# Patient Record
Sex: MALE | Race: WHITE | URBAN - METROPOLITAN AREA
[De-identification: names, ages, dates, MRNs, and addresses within clinical notes are randomized per-mention and may not be internally consistent; named-entity substitution may affect disease eponyms.]

---

## 2020-07-31 ENCOUNTER — APPOINTMENT (RX ONLY)
Dept: URBAN - METROPOLITAN AREA CLINIC 146 | Facility: CLINIC | Age: 60
Setting detail: DERMATOLOGY
End: 2020-07-31

## 2020-07-31 VITALS — TEMPERATURE: 96.98 F

## 2020-07-31 DIAGNOSIS — D17 BENIGN LIPOMATOUS NEOPLASM: ICD-10-CM

## 2020-07-31 PROBLEM — D17.22 BENIGN LIPOMATOUS NEOPLASM OF SKIN AND SUBCUTANEOUS TISSUE OF LEFT ARM: Status: ACTIVE | Noted: 2020-07-31

## 2020-07-31 PROCEDURE — ? COUNSELING

## 2020-07-31 PROCEDURE — ? FULL BODY SKIN EXAM - DECLINED

## 2020-07-31 PROCEDURE — 99202 OFFICE O/P NEW SF 15 MIN: CPT

## 2020-07-31 PROCEDURE — ? ADDITIONAL NOTES

## 2020-07-31 ASSESSMENT — LOCATION ZONE DERM: LOCATION ZONE: ARM

## 2020-07-31 ASSESSMENT — LOCATION SIMPLE DESCRIPTION DERM: LOCATION SIMPLE: LEFT SHOULDER

## 2020-07-31 ASSESSMENT — LOCATION DETAILED DESCRIPTION DERM: LOCATION DETAILED: LEFT ANTERIOR SHOULDER

## 2023-07-14 NOTE — HPI: SKIN LESION
[facility]  [address] [address]  [phone]    Jack Pall  : 1949     HPI   69 y.o., male returns in follow up for CaP. S/P RALP on 23. Path showed Maryana 3+4, T2N0 with neg margins. PSA is und on 23. Cont to wear a small shield for CHANTAL. Reports ED despite [facility] rehab. Past Medical History:   Diagnosis Date    Arrhythmia     paroxsymal at fib    Atrial fibrillation ([address]) 2011    2012 ablation    Cancer [facility])     prostate cancer    Dyslipidemia 2011    Edema 2011    ELMER (generalized anxiety disorder) 2014    History of tobacco use 2011    Former smoker, quit in 80's- 12 yr history     Hypercholesterolemia     Hypertension     Lesion of vocal cord     Sleep apnea 2014    does not use CPAP regularly     Past Surgical History:   Procedure Laterality Date    ABLATION OF DYSRHYTHMIC FOCUS      Dr Charlene Fleming- cardiologist did it    COLONOSCOPY      HEENT Right 2021    SML w/ excisional bx of R VC lesion- Sara    OTHER SURGICAL HISTORY  1974    anal fistulectomy    OTHER SURGICAL HISTORY      Lesion removal forehead- skin cancer    PROSTATE BIOPSY N/A 2023    PROSTATE BIOPSY FUSION performed by Shellie Burk DO at [facility] MAIN OR    PROSTATECTOMY N/A 2023    PROSTATECTOMY LAPAROSCOPIC ROBOTIC, POSSIBLE BILATERAL LYMPH NODE DISSECTION performed by Shellie Burk DO at [facility] MAIN OR     Current Outpatient Medications   Medication Sig Dispense Refill    ALPRAZolam (XANAX) 0.5 MG tablet TAKE 1 TABLET BY MOUTH IN THE MORNING AND 1 AT BEDTIME FOR 30 DAYS .  DO NOT EXCEED 2 PER 24 HOURS      busPIRone (BUSPAR) 15 MG tablet Take 15 mg by mouth 3 times daily 270 tablet 1    losartan-hydroCHLOROthiazide (HYZAAR) 100-25 MG per tablet Take 1 tablet by mouth daily 90 tablet 1    metoprolol succinate (TOPROL XL) 50 MG extended release tablet Take 1 tablet by mouth daily 90 tablet 1    simvastatin (ZOCOR) 40 MG tablet
What Type Of Note Output Would You Prefer (Optional)?: Bullet Format
How Severe Is Your Skin Lesion?: mild
Has Your Skin Lesion Been Treated?: not been treated
Is This A New Presentation, Or A Follow-Up?: Growth